# Patient Record
Sex: FEMALE | URBAN - METROPOLITAN AREA
[De-identification: names, ages, dates, MRNs, and addresses within clinical notes are randomized per-mention and may not be internally consistent; named-entity substitution may affect disease eponyms.]

---

## 2024-10-07 ENCOUNTER — TELEPHONE (OUTPATIENT)
Dept: SURGERY | Facility: CLINIC | Age: 54
End: 2024-10-07

## 2024-10-07 NOTE — TELEPHONE ENCOUNTER
Called pt regarding new consult appt, pt stated that she wants to come in and see Dr. Sales do to a lot of scar tissue build up that is causing her a lot of pain and discomfort on her Right breast, pt stayed that she was ref to Dr. Sales by her  PCP, Pt sated that will go to [facility] to  her records and will bring them to our office so they can later schedule her  to see Dr. Sales, called pt back and gave her the name of Dr. Case Ballesteros. Pt was appreciative of the call back